# Patient Record
Sex: MALE | Race: WHITE
[De-identification: names, ages, dates, MRNs, and addresses within clinical notes are randomized per-mention and may not be internally consistent; named-entity substitution may affect disease eponyms.]

---

## 2018-09-27 ENCOUNTER — HOSPITAL ENCOUNTER (OUTPATIENT)
Age: 63
Discharge: HOME | End: 2018-09-27

## 2018-09-27 ENCOUNTER — HOSPITAL ENCOUNTER (OUTPATIENT)
Dept: HOSPITAL 91 - EEG | Age: 63
Discharge: HOME | End: 2018-09-27
Payer: COMMERCIAL

## 2018-09-27 DIAGNOSIS — G40.909: Primary | ICD-10-CM

## 2018-09-27 PROCEDURE — 95819 EEG AWAKE AND ASLEEP: CPT

## 2019-01-02 ENCOUNTER — HOSPITAL ENCOUNTER (EMERGENCY)
Dept: HOSPITAL 10 - E/R | Age: 64
Discharge: HOME | End: 2019-01-02
Payer: COMMERCIAL

## 2019-01-02 ENCOUNTER — HOSPITAL ENCOUNTER (EMERGENCY)
Dept: HOSPITAL 91 - E/R | Age: 64
Discharge: HOME | End: 2019-01-02
Payer: COMMERCIAL

## 2019-01-02 VITALS — DIASTOLIC BLOOD PRESSURE: 78 MMHG | RESPIRATION RATE: 18 BRPM | HEART RATE: 80 BPM | SYSTOLIC BLOOD PRESSURE: 134 MMHG

## 2019-01-02 VITALS
HEIGHT: 66 IN | BODY MASS INDEX: 26.36 KG/M2 | WEIGHT: 164.02 LBS | WEIGHT: 164.02 LBS | BODY MASS INDEX: 26.36 KG/M2 | HEIGHT: 66 IN

## 2019-01-02 DIAGNOSIS — R40.2252: ICD-10-CM

## 2019-01-02 DIAGNOSIS — K40.90: Primary | ICD-10-CM

## 2019-01-02 DIAGNOSIS — R40.2142: ICD-10-CM

## 2019-01-02 DIAGNOSIS — R40.2362: ICD-10-CM

## 2019-01-02 PROCEDURE — 99283 EMERGENCY DEPT VISIT LOW MDM: CPT

## 2019-01-02 NOTE — ERD
ER Documentation


Chief Complaint


Chief Complaint





c/o right groin pain. Hx: inguinal hernia. Cough with greenish plegm





HPI


Patient is a 63-year-old male with seizures who presents with a right-sided 


inguinal hernia.  He describes it as a "burning".  He has had this for the past 


3 weeks.  He said that it was worse last night after he was coughing.  He had a 


bowel movement today.  He denies vomiting.  He is currently taking Tylenol and 3


days of Keflex for a cough.  Upon review of old medical records the patient has 


previous visits with seizures.  He does have a primary doctor but no surgeon.





ROS


All systems reviewed and are negative except as per history of present illness.





Medications


Home Meds


Active Scripts


Cephalexin* (Keflex*) 500 Mg Capsule, 500 MG PO BID for 4 Days, CAP


   Prov:PATRICK ANNA MD         1/2/19


Ibuprofen* (Motrin*) 600 Mg Tab, 600 MG PO Q6H PRN for PAIN AND OR ELEVATED 


TEMP, #30 TAB


   Prov:PATRICK ANNA MD         1/2/19


Discontinued Reported Medications


Ketoprofen (Orudis) 50 Mg Capsule, 50 MG PO QHS, CAP


   10/4/16


Levetiracetam* (Keppra*) 750 Mg Tablet, 1500 MG PO BID, TAB


   10/4/16





Allergies


Allergies:  


Coded Allergies:  


     Penicillins (Verified  Allergy, Unknown, swelling, 1/2/19)


     Sulfa (Sulfonamide Antibiotics) (Verified  Allergy, Unknown, swelling, 


1/2/19)





PMhx/Soc


History of Surgery:  Yes (right hip ORIF)


Anesthesia Reaction:  No


Hx Neurological Disorder:  Yes (seizures)


Hx Respiratory Disorders:  No


Hx Cardiac Disorders:  No


Hx Psychiatric Problems:  No


Hx Miscellaneous Medical Probl:  No


Hx Alcohol Use:  No


Hx Substance Use:  No


Hx Tobacco Use:  No


Smoking Status:  Never smoker





FmHx


Family History:  No diabetes





Physical Exam


Vitals





Vital Signs


  Date      Temp  Pulse  Resp  B/P (MAP)   Pulse Ox  O2          O2 Flow    FiO2


Time                                                 Delivery    Rate


    1/2/19           80    18      134/78        99  Room Air


     17:18                           (96)


    1/2/19  97.9     78    20      136/92        98


     15:53                          (107)





Physical Exam


Const:   No acute distress


Head:   Atraumatic 


Eyes:    Normal Conjunctiva


ENT:    Normal External Ears, Nose and Mouth.


Neck:               Full range of motion. No meningismus.


Resp:   Clear to auscultation bilaterally


Cardio:   Regular rate and rhythm, no murmurs


Abd:    Soft, easily reducible right inguinal hernia without signs of 


incarceration


Skin:   No petechiae or rashes


Back:   No midline or flank tenderness


Ext:    No cyanosis, or edema


Neur:   Awake and alert


Psych:    Normal Mood and Affect





Procedures/MDM


Patient is a 63-year-old male who presents with a reducible right inguinal 


hernia.  The patient has no signs of strangulation or incarceration.  The 


patient will be discharged home.  He will need to follow-up with Dr. Flores from


surgery for elective hernia repair.  The patient does not need any further 


workup or admission and is otherwise well-appearing.  He will be given 4 more 


days of Keflex for a total of 7 days for his cough.  He can return for any 


worsening symptoms.





Departure


Diagnosis:  


   Primary Impression:  


   Hernia


Condition:  Fair


Patient Instructions:  Hernia (Inguinal, Ventral, Umbilical)


Referrals:  


NONA FLORES MD





Additional Instructions:  


SPECIALIST:  YOU HAVE A MEDICAL CONDITION WHICH REQUIRES YOU TO SEE A   


SPECIALIST WITHIN THE NEXT 1-2 DAYS. PLEASE FOLLOW UP WITH YOUR PRIMARY 


PHYSICIAN FOR REFFERAL.IF YOU DO NOT HAVE A PRIMARY CARE PHYSICIAN AND/OR YOU 


CAN NOT AFFORD TO SEE A PHYSICIAN THE FOLLOWING RESOURCES HAVE BEEN SUPPLIED TO 


YOU. IT IS YOUR RESPONSIBILITY TO BE SEEN BY THE SPECIALIST











PATRICK ANNA MD                 Jan 2, 2019 18:41

## 2019-01-25 ENCOUNTER — HOSPITAL ENCOUNTER (OUTPATIENT)
Dept: HOSPITAL 10 - SDS | Age: 64
Discharge: HOME | End: 2019-01-25
Attending: SURGERY
Payer: COMMERCIAL

## 2019-01-25 ENCOUNTER — HOSPITAL ENCOUNTER (OUTPATIENT)
Dept: HOSPITAL 91 - SDS | Age: 64
Discharge: HOME | End: 2019-01-25
Payer: COMMERCIAL

## 2019-01-25 VITALS — SYSTOLIC BLOOD PRESSURE: 127 MMHG | HEART RATE: 56 BPM | DIASTOLIC BLOOD PRESSURE: 79 MMHG | RESPIRATION RATE: 13 BRPM

## 2019-01-25 VITALS — DIASTOLIC BLOOD PRESSURE: 78 MMHG | RESPIRATION RATE: 16 BRPM | HEART RATE: 64 BPM | SYSTOLIC BLOOD PRESSURE: 125 MMHG

## 2019-01-25 VITALS — DIASTOLIC BLOOD PRESSURE: 78 MMHG | RESPIRATION RATE: 17 BRPM | SYSTOLIC BLOOD PRESSURE: 144 MMHG | HEART RATE: 62 BPM

## 2019-01-25 VITALS — DIASTOLIC BLOOD PRESSURE: 86 MMHG | RESPIRATION RATE: 28 BRPM | SYSTOLIC BLOOD PRESSURE: 139 MMHG | HEART RATE: 64 BPM

## 2019-01-25 VITALS — DIASTOLIC BLOOD PRESSURE: 57 MMHG | RESPIRATION RATE: 12 BRPM | HEART RATE: 68 BPM | SYSTOLIC BLOOD PRESSURE: 114 MMHG

## 2019-01-25 VITALS — DIASTOLIC BLOOD PRESSURE: 80 MMHG | HEART RATE: 62 BPM | RESPIRATION RATE: 21 BRPM | SYSTOLIC BLOOD PRESSURE: 128 MMHG

## 2019-01-25 VITALS — SYSTOLIC BLOOD PRESSURE: 137 MMHG | DIASTOLIC BLOOD PRESSURE: 90 MMHG | HEART RATE: 62 BPM | RESPIRATION RATE: 17 BRPM

## 2019-01-25 VITALS
BODY MASS INDEX: 27.66 KG/M2 | WEIGHT: 162.04 LBS | HEIGHT: 64 IN | BODY MASS INDEX: 27.66 KG/M2 | BODY MASS INDEX: 27.66 KG/M2 | HEIGHT: 64 IN | WEIGHT: 162.04 LBS

## 2019-01-25 VITALS — SYSTOLIC BLOOD PRESSURE: 150 MMHG | DIASTOLIC BLOOD PRESSURE: 83 MMHG | HEART RATE: 62 BPM | RESPIRATION RATE: 21 BRPM

## 2019-01-25 VITALS — HEART RATE: 62 BPM | DIASTOLIC BLOOD PRESSURE: 78 MMHG | SYSTOLIC BLOOD PRESSURE: 122 MMHG | RESPIRATION RATE: 13 BRPM

## 2019-01-25 VITALS — HEART RATE: 60 BPM | SYSTOLIC BLOOD PRESSURE: 131 MMHG | DIASTOLIC BLOOD PRESSURE: 80 MMHG | RESPIRATION RATE: 21 BRPM

## 2019-01-25 VITALS — DIASTOLIC BLOOD PRESSURE: 87 MMHG | SYSTOLIC BLOOD PRESSURE: 133 MMHG | RESPIRATION RATE: 17 BRPM | HEART RATE: 60 BPM

## 2019-01-25 VITALS — HEART RATE: 70 BPM | DIASTOLIC BLOOD PRESSURE: 71 MMHG | RESPIRATION RATE: 11 BRPM | SYSTOLIC BLOOD PRESSURE: 119 MMHG

## 2019-01-25 VITALS — SYSTOLIC BLOOD PRESSURE: 128 MMHG | DIASTOLIC BLOOD PRESSURE: 66 MMHG | RESPIRATION RATE: 17 BRPM | HEART RATE: 79 BPM

## 2019-01-25 VITALS — HEART RATE: 79 BPM | RESPIRATION RATE: 20 BRPM | SYSTOLIC BLOOD PRESSURE: 136 MMHG | DIASTOLIC BLOOD PRESSURE: 81 MMHG

## 2019-01-25 VITALS — HEART RATE: 70 BPM | RESPIRATION RATE: 21 BRPM | DIASTOLIC BLOOD PRESSURE: 73 MMHG | SYSTOLIC BLOOD PRESSURE: 120 MMHG

## 2019-01-25 VITALS — HEART RATE: 66 BPM | DIASTOLIC BLOOD PRESSURE: 66 MMHG | SYSTOLIC BLOOD PRESSURE: 126 MMHG | RESPIRATION RATE: 26 BRPM

## 2019-01-25 VITALS — SYSTOLIC BLOOD PRESSURE: 121 MMHG | RESPIRATION RATE: 26 BRPM | HEART RATE: 60 BPM | DIASTOLIC BLOOD PRESSURE: 76 MMHG

## 2019-01-25 VITALS — RESPIRATION RATE: 15 BRPM | SYSTOLIC BLOOD PRESSURE: 146 MMHG | DIASTOLIC BLOOD PRESSURE: 86 MMHG

## 2019-01-25 VITALS — RESPIRATION RATE: 16 BRPM | HEART RATE: 64 BPM | SYSTOLIC BLOOD PRESSURE: 123 MMHG | DIASTOLIC BLOOD PRESSURE: 68 MMHG

## 2019-01-25 VITALS — RESPIRATION RATE: 19 BRPM | SYSTOLIC BLOOD PRESSURE: 146 MMHG | HEART RATE: 60 BPM | DIASTOLIC BLOOD PRESSURE: 81 MMHG

## 2019-01-25 DIAGNOSIS — K40.90: Primary | ICD-10-CM

## 2019-01-25 DIAGNOSIS — D17.6: ICD-10-CM

## 2019-01-25 LAB
INR: 0.86
PARTIAL THROMBOPLASTIN TIME: 30.5 SEC (ref 23–35)
PROTIME: 11.8 SEC (ref 11.9–14.9)
PT RATIO: 0.9

## 2019-01-25 PROCEDURE — 88302 TISSUE EXAM BY PATHOLOGIST: CPT

## 2019-01-25 PROCEDURE — C1781 MESH (IMPLANTABLE): HCPCS

## 2019-01-25 PROCEDURE — 88304 TISSUE EXAM BY PATHOLOGIST: CPT

## 2019-01-25 PROCEDURE — 85610 PROTHROMBIN TIME: CPT

## 2019-01-25 PROCEDURE — 85730 THROMBOPLASTIN TIME PARTIAL: CPT

## 2019-01-25 PROCEDURE — 49505 PRP I/HERN INIT REDUC >5 YR: CPT

## 2019-01-25 RX ADMIN — BUPIVACAINE HYDROCHLORIDE AND EPINEPHRINE BITARTRATE 1 ML: 5; .005 INJECTION, SOLUTION EPIDURAL; INTRACAUDAL; PERINEURAL at 12:35

## 2019-01-25 RX ADMIN — HYDROCODONE BITARTRATE AND ACETAMINOPHEN 1 TAB: 5; 325 TABLET ORAL at 17:54

## 2019-01-25 RX ADMIN — FENTANYL CITRATE 1 MCG: 50 INJECTION, SOLUTION INTRAMUSCULAR; INTRAVENOUS at 14:30

## 2019-01-25 RX ADMIN — HYDROMORPHONE HYDROCHLORIDE 1 MG: 2 INJECTION INTRAMUSCULAR; INTRAVENOUS; SUBCUTANEOUS at 14:11

## 2019-01-25 RX ADMIN — LIDOCAINE HYDROCHLORIDE 1 ML: 10 INJECTION, SOLUTION INFILTRATION; PERINEURAL at 12:35

## 2019-01-25 RX ADMIN — BACITRACIN 1 ML: 50000 INJECTION, POWDER, FOR SOLUTION INTRAMUSCULAR at 12:36

## 2019-01-25 NOTE — HPN
Date/Time of Note


Date/Time of Note


DATE: 1/25/19 


TIME: 09:40





Interval H&P Admission Note


Pt. seen H&P reviewed:  No system changes











ANTON MAYFIELD                   Jan 25, 2019 09:40

## 2019-01-25 NOTE — PAC
Date/Time of Note


Date/Time of Note


DATE: 1/25/19 


TIME: 13:44





Post-Anesthesia Notes


Post-Anesthesia Note


Last documented vital signs





Vital Signs


  Date      Temp  Pulse  Resp  B/P (MAP)   Pulse Ox  O2          O2 Flow    FiO2


Time                                                 Delivery    Rate


   1/25/19  97.8     64    16      123/68        99  Room Air


     09:34                           (86)





Activity:  WNL


Respiratory function:  WNL


Cardiovascular function:  WNL


Mental status:  Baseline


Pain reasonably controlled:  Yes


Hydration appropriate:  Yes


Nausea/Vomiting absent:  No











BENJY HEAD MD               Jan 25, 2019 13:45

## 2019-01-25 NOTE — OPR
Date/Time of Note


Date/Time of Note


DATE: 1/25/19 


TIME: 11:58





Operative Report


Procedure Date:  Jan 25, 2019


Preoperative Diagnosis


Right inguinal hernia, initial, reducible


Postoperative Diagnosis


Same, lipoma of the cord


Operation/Procedure Performed


Right inguinal hernia repair with mesh, excision of spermatic cord lesion


Surgeon


Anton Mayfield MD FACS


Assistant


None


Anesthesia Type:  general


Estimated Blood Loss:  10 - 50 ml's


Transfusion


   none


Specimen


Hernia sac sent to pathology


Grafts/Implants


none


Complications


none


Pt Condition Post Procedure:  stable


Disposition:  PACU


Indications


This patient presented with recurrent pain followed by a lump he noted in his 


right groin region.  Workup revealed a right inguinal hernia that is reducible. 


For treatment he will undergo repair of the hernia with mesh and excision of 


accompanying lipoma of the cord if necessary.  Risk and benefits including 


bleeding infection and recurrence were explained and informed consent was 


obtained.


Procedure Description


The patient was laid supine on the operating room table.  Venodyne boots were 


applied.  Timeout was conducted.  Antibiotics were administered.  The groin was 


prepped and draped in sterile manner.  A 7 cm incision was made along the skin 


lines 2/3 of the way distally from the anterior superior iliac spine to the 


patient's pubic tubercle on the right side.  The incision was made with a 


scalpel and then deepened with cautery through Camper's and Tavia's fascia 


until the external oblique aponeurosis was encountered.  A nick was made along 


the center of the external oblique upon neurosis with the blade and thereafter 


the Metzenbaum scissors were used to open the external ring from the pubic 


tubercle up towards the anterior superior iliac spine.  The ilioinguinal nerve 


was identified and moved out of the operative field sway.  The surgeon then used


blunt dissection with a sponge stick to isolate the cord structures from 


surrounding fascia and thereafter encircled the cord structures with a Penrose 


drain.  The Penrose drain was used to retract the cord structures and the ileal 


inguinal nerve out of the operative field.  The cord was examined and along the 


lipoma was noted.  Excision of lipoma of the cord was carried out.  The hernia 


sac was then identified and noted to be adherent to the cremasteric fibers and 


the spermatic cord.  Using electrocautery the hernia sac was dissected away from


the structures.  The hernia sac was thereafter opened and inspected there was no


bowel or anything suspicious inside it.  All hernia sac content was reduced back


into the peritoneum and thereafter high ligation of the sac was carried out with


a 0 silk suture and the hernia sac was reduced into the peritoneal cavity.  The 


fascia was then inspected and dressed lateral to the epigastric vessels a 


indirect hernia defect was identified.  The fascial defect was closed with 


interrupted 0 Vicryl sutures and reinforced with a Prolene plug mesh.  


Interrupted Vicryl sutures were used to affix the petals of the plug to the 


inner surface of the conjoint tendon at the area of the fascial defect.  Once 


the fascial defect was closed patch mesh was brought onto the field.  This was 


likewise a Bard Prolene mesh and it was cut to appropriate size.  The medial 


edge of the mesh was affixed to the patient's right pubic periosteum about 1 cm 


to the area of the tubercle.  Afterwards a running 0 Vicryl suture was used to 


affix the superior edge of the mesh to the patient's conjoint tendon from the 


pubic tubercle laterally toward the anterior superior iliac spine.  A second 


Vicryl suture was then used to affix the patch mesh to the shelving edge of the 


ileal inguinal ligament from the area of the pubic tubercle toward the anterior 


superior iliac spine using running 0 Vicryl suture.  The spermatic cord was 


placed in between the 2 fish tails of the patch mesh and the 2 fishtails were 


thereafter affixed to each other using an interrupted suture taking care to 


ensure there is no impingement upon the spermatic cord.  The wound was then 


irrigated with saline.  The cord structures and ilioinguinal nerve were then 


placed back into their anatomic position.  The external oblique fascia was 


thereafter reapproximated using running 0 Vicryl suture.  The subcutaneous 


tissue was then reapproximated using 2-0 Vicryl sutures.  The skin was then 


closed with 4-0 Monocryl and Dermabond dressing was applied after wet and dry 


dressings were applied to the wound.  At the end of the procedure I confirmed 


that the patient's testicles are in the appropriate position.  The patient was 


after weaned from anesthesia and disposition to the postanesthesia care unit in 


stable condition.  All instrument and needle and sponge counts were correct at 


the end of the procedure 2.











ANTON MAYFIELD                   Jan 25, 2019 11:59

## 2019-01-28 ENCOUNTER — HOSPITAL ENCOUNTER (OUTPATIENT)
Dept: HOSPITAL 91 - E/R | Age: 64
Setting detail: OBSERVATION
LOS: 1 days | Discharge: HOME | End: 2019-01-29
Payer: COMMERCIAL

## 2019-01-28 ENCOUNTER — HOSPITAL ENCOUNTER (OUTPATIENT)
Dept: HOSPITAL 10 - E/R | Age: 64
Setting detail: OBSERVATION
LOS: 1 days | Discharge: HOME | End: 2019-01-29
Attending: INTERNAL MEDICINE | Admitting: INTERNAL MEDICINE
Payer: COMMERCIAL

## 2019-01-28 VITALS
BODY MASS INDEX: 26.96 KG/M2 | HEIGHT: 66 IN | WEIGHT: 167.77 LBS | HEIGHT: 66 IN | BODY MASS INDEX: 26.96 KG/M2 | WEIGHT: 167.77 LBS

## 2019-01-28 VITALS — RESPIRATION RATE: 18 BRPM | HEART RATE: 64 BPM | DIASTOLIC BLOOD PRESSURE: 92 MMHG | SYSTOLIC BLOOD PRESSURE: 159 MMHG

## 2019-01-28 DIAGNOSIS — R10.31: Primary | ICD-10-CM

## 2019-01-28 LAB
ADD MAN DIFF?: YES
ANION GAP: 11 (ref 5–13)
ANISOCYTOSIS: (no result) (ref 0–0)
BASOPHIL #M: 0.1 10^3/UL (ref 0–0)
BASOPHILS % (M): 2 % (ref 0–2)
BLOOD UREA NITROGEN: 13 MG/DL (ref 7–20)
BURR CELLS: (no result) (ref 0–0)
CALCIUM: 9 MG/DL (ref 8.4–10.2)
CARBON DIOXIDE: 29 MMOL/L (ref 21–31)
CHLORIDE: 101 MMOL/L (ref 97–110)
CREATININE: 0.75 MG/DL (ref 0.61–1.24)
EOSINOPHILS % (M): 2 % (ref 0–7)
GLUCOSE: 100 MG/DL (ref 70–220)
HEMATOCRIT: 34.8 % (ref 42–52)
HEMOGLOBIN: 11.5 G/DL (ref 14–18)
IMMATURE GRANS #M: 0.08 10^3/UL (ref 0–0.03)
IMMATURE GRANS % (M): 0.9 % (ref 0–0.43)
LYMPHOCYTES #M: 3 10^3/UL (ref 0.8–2.9)
LYMPHOCYTES % (M): 35 % (ref 15–51)
MEAN CORPUSCULAR HEMOGLOBIN: 29.6 PG (ref 29–33)
MEAN CORPUSCULAR HGB CONC: 33 G/DL (ref 32–37)
MEAN CORPUSCULAR VOLUME: 89.5 FL (ref 82–101)
MEAN PLATELET VOLUME: 9.7 FL (ref 7.4–10.4)
MICROCYTOSIS: (no result) (ref 0–0)
MONOCYTE #M: 0.7 10^3/UL (ref 0.3–0.9)
MONOCYTES % (M): 8 % (ref 0–11)
NUCLEATED RED BLOOD CELLS%: 0 /100WBC (ref 0–0)
PLATELET COUNT: 183 10^3/UL (ref 140–415)
PLATELET ESTIMATE: NORMAL
POLYCHROMASIA: (no result) (ref 0–0)
POTASSIUM: 3.9 MMOL/L (ref 3.5–5.1)
RED BLOOD COUNT: 3.89 10^6/UL (ref 4.7–6.1)
RED CELL DISTRIBUTION WIDTH: 13.1 % (ref 11.5–14.5)
SEGMENTED NEUTROPHILS (M) %: 53 % (ref 39–77)
SMUDGE%M: 27 % (ref 0–0)
SODIUM: 141 MMOL/L (ref 135–144)
WHITE BLOOD COUNT: 8.8 10^3/UL (ref 4.8–10.8)

## 2019-01-28 PROCEDURE — 36415 COLL VENOUS BLD VENIPUNCTURE: CPT

## 2019-01-28 PROCEDURE — 96375 TX/PRO/DX INJ NEW DRUG ADDON: CPT

## 2019-01-28 PROCEDURE — 96365 THER/PROPH/DIAG IV INF INIT: CPT

## 2019-01-28 PROCEDURE — 85025 COMPLETE CBC W/AUTO DIFF WBC: CPT

## 2019-01-28 PROCEDURE — 72193 CT PELVIS W/DYE: CPT

## 2019-01-28 PROCEDURE — 80048 BASIC METABOLIC PNL TOTAL CA: CPT

## 2019-01-28 PROCEDURE — C9113 INJ PANTOPRAZOLE SODIUM, VIA: HCPCS

## 2019-01-28 PROCEDURE — G0378 HOSPITAL OBSERVATION PER HR: HCPCS

## 2019-01-28 PROCEDURE — 99285 EMERGENCY DEPT VISIT HI MDM: CPT

## 2019-01-28 RX ADMIN — PIPERACILLIN SODIUM AND TAZOBACTAM SODIUM 1 MLS/HR: 3; .375 INJECTION, POWDER, LYOPHILIZED, FOR SOLUTION INTRAVENOUS at 21:24

## 2019-01-28 RX ADMIN — HYDROMORPHONE HYDROCHLORIDE 1 MG: 1 INJECTION, SOLUTION INTRAMUSCULAR; INTRAVENOUS; SUBCUTANEOUS at 21:13

## 2019-01-28 RX ADMIN — VANCOMYCIN HYDROCHLORIDE 1 MLS/HR: 1 INJECTION, POWDER, LYOPHILIZED, FOR SOLUTION INTRAVENOUS at 22:51

## 2019-01-28 RX ADMIN — CEFEPIME HYDROCHLORIDE 1 MLS/HR: 2 INJECTION, POWDER, FOR SOLUTION INTRAVENOUS at 22:07

## 2019-01-28 RX ADMIN — DIPHENHYDRAMINE HYDROCHLORIDE 1 MG: 50 INJECTION, SOLUTION INTRAMUSCULAR; INTRAVENOUS at 21:51

## 2019-01-28 NOTE — ERD
ER Documentation


Chief Complaint


Chief Complaint





AP worse today; s/p hernia sx last friday; sent by PMD here





HPI


63-year-old male status post right inguinal hernia repair on 1/25/2019 by Dr. Galvan, presenting with worsening right inguinal pain and swelling.  His pain 


is 9 out of 10, worse with movement and laying down.  No alleviating factors.  


Norco does not help with pain.  He spoke with his surgeon, who recommended he 


come to the ER.  He denies any fevers or chills.  No nausea or vomiting.  He was


constipated but took stool softeners with a bowel movement today.  No dysuria or


hematuria.





ROS


All systems reviewed and are negative except as per history of present illness.





Medications


Home Meds


Reported Medications


Trazodone Hcl* (Trazodone Hcl*) 50 Mg Tablet, 50 MG PO DAILY, #30 TAB


   1/28/19


Diclofenac Sodium* (Diclofenac Sodium*) 50 Mg Tablet.dr, 50 MG PO TID, #90 TAB


   1/28/19


Hydrocodone Bit-Acetaminophen* (Vicodin*) 5-300 Tab, 2 TAB PO Q4H PRN for PAIN, 


TAB


   1/28/19


Diclofenac Sodium* (Diclofenac Sodium*) 75 Mg Tablet.dr, 75 MG PO DAILY PRN for 


PAIN AND/OR INFLAMMATION, #60 TAB


   1/25/19


Discontinued Scripts


Cephalexin* (Keflex*) 500 Mg Capsule, 500 MG PO BID for 4 Days, CAP


   Prov:PATRICK ANNA MD         1/2/19


Ibuprofen* (Motrin*) 600 Mg Tab, 600 MG PO Q6H PRN for PAIN AND OR ELEVATED 


TEMP, #30 TAB


   Prov:PATRICK ANNA MD         1/2/19





Allergies


Allergies:  


Coded Allergies:  


     Sulfa (Sulfonamide Antibiotics) (Verified  Allergy, Unknown, swelling, 


1/25/19)


     piperacillin (Verified  Allergy, Unknown, lip swelling & redness, facial 


itching & flushing, 1/28/19)


     tazobactam (Verified  Allergy, Unknown, lip swelling & redness, facial 


itching & flushing, 1/28/19)





PMhx/Soc


History of Surgery:  Yes (RIGHT HIP ORIF, TONSILLECTOMY, Inguinal Hernia)


Anesthesia Reaction:  No


Hx Neurological Disorder:  Yes (SEIZURE)


Hx Respiratory Disorders:  No


Hx Cardiac Disorders:  No


Hx Psychiatric Problems:  No


Hx Miscellaneous Medical Probl:  No


Hx Alcohol Use:  No


Hx Substance Use:  No


Hx Tobacco Use:  No


Smoking Status:  Never smoker





FmHx


Family History:  No diabetes





Physical Exam


Vitals





Vital Signs


  Date      Temp  Pulse  Resp  B/P (MAP)   Pulse Ox  O2          O2 Flow    FiO2


Time                                                 Delivery    Rate


   1/28/19  98.1     71    20      113/57        98


     18:42                           (75)





Physical Exam


Const:   No acute distress in mild distress secondary to pain, nontoxic


Head:   Atraumatic 


Eyes:    Normal Conjunctiva


ENT:    Normal External Ears, Nose and Mouth.


Neck:               Full range of motion. No meningismus.


Resp:   Clear to auscultation bilaterally


Cardio:   Regular rate and rhythm, no murmurs


Abd:    Soft, non tender, non distended. Normal bowel sounds.  Right inguinal 


area with erythema and bruising.  There is swelling with significant tenderness 


to palpation.  No crepitus.


:    Penis and scotum apper normal, nontender


Back:   No midline or flank tenderness


Ext:    No cyanosis, or edema


Neur:   Awake and alert


Psych:    Normal Mood and Affect


Result Diagram:  


1/28/19 2104 1/28/19 2104





Results 24 hrs





Laboratory Tests


              Test
                                 1/28/19
21:04


              White Blood Count                      8.8 10^3/ul


              Red Blood Count                       3.89 10^6/ul


              Hemoglobin                               11.5 g/dl


              Hematocrit                                  34.8 %


              Mean Corpuscular Volume                    89.5 fl


              Mean Corpuscular Hemoglobin                29.6 pg


              Mean Corpuscular Hemoglobin
Concent     33.0 g/dl 



              Red Cell Distribution Width                 13.1 %


              Platelet Count                         183 10^3/UL


              Mean Platelet Volume                        9.7 fl


              Immature Granulocytes %                    0.900 %


              Neutrophils %                         %


              Lymphocytes %                         %


              Monocytes %                           %


              Eosinophils %                         %


              Basophils %                           %


              Nucleated Red Blood Cells %            0.0 /100WBC


              Immature Granulocytes #              0.080 10^3/ul


              Neutrophils #                         10^3/ul


              Lymphocytes #                         10^3/ul


              Monocytes #                           10^3/ul


              Eosinophils #                         10^3/ul


              Basophils #                           10^3/ul


              Nucleated Red Blood Cells #           10^3/ul


              Sodium Level                            141 mmol/L


              Potassium Level                         3.9 mmol/L


              Chloride Level                          101 mmol/L


              Carbon Dioxide Level                     29 mmol/L


              Anion Gap                                       11


              Blood Urea Nitrogen                       13 mg/dl


              Creatinine                              0.75 mg/dl


              Est Glomerular Filtrat Rate
mL/min   > 60 mL/min 



              Glucose Level                            100 mg/dl


              Calcium Level                            9.0 mg/dl





Current Medications


 Medications
   Dose
          Sig/Homero
       Start Time
   Status  Last


 (Trade)       Ordered        Route
 PRN     Stop Time              Admin
Dose


                              Reason                                Admin


                1 mg           ONCE  STAT
    1/28/19       DC           1/28/19


Hydromorphone                 IV
            20:37
                       21:13



HCl
                                         1/28/19 20:38


(Dilaudid)


 Vancomycin     250 ml @ 
     ONCE  STAT
    1/28/19                



HCl            125 mls/hr     IVPB
          21:02



                                             1/28/19 23:01


 Piperacillin   100 ml @ 
     ONCE  STAT
    1/28/19       DC           1/28/19


Sod/
          200 mls/hr     IVPB
          21:02
                       21:24



Tazobactam                                   1/28/19 21:31


Sod


 Ondansetron    4 mg           BR[facility]GE ORDER   1/28/19                



HCl
  (Zofran                 PRN
 IV
       21:30



Inj)                          NAUSEA/VOMITI  1/29/19 21:29


                              NG


                650 mg         ER BR[facility]GE      1/28/19                



Acetaminophen                 PRN
 PO
       21:30




  (Tylenol                   .MILD PAIN     1/29/19 21:29


Tab)                          1-3 OR TEMP


                25 mg          ONCE  ONCE
    1/28/19       DC           1/28/19


Diphenhydrami                 IV
            22:00
                       21:51



ne
 HCl
                                     1/28/19 22:01


(Benadryl)


 Cefepime HCl   50 ml @ 
      ONCE  ONCE
    1/28/19 1/28/19


               100 mls/hr     IVPB
          22:00
                       22:07



                                             1/28/19 22:29


 IV Flush
      10 ml          STK-MED        1/28/19       DC       



(NS 10 ml)                    ONCE
 .ROUTE
  22:04



                                             1/28/19 22:05


 Sodium         100 ml @ ud    STK-MED        1/28/19       DC       



Chloride                      ONCE
 .ROUTE
  22:04



                                             1/28/19 22:05


 Iohexol
       150 ml         STK-MED        1/28/19       DC       



(Omnipaque                    ONCE
 .ROUTE
  22:05



300mg/
 ml)                                  1/28/19 22:06








Procedures/MDM


EMERGENT LABS AND DIAGNOSTIC STUDIES: 


Lab Results above were reviewed and interpreted by me. 





CBC: no anemia or evidence of infection


BMP: No evidence of electrolyte abnormality, renal failure, hypoglycemia


___________________________________________________________ 


Radiology Results as interpreted by Radiology below were reviewed by ALEIDA Kenney MD: 





CT pelvis with IV contrast: Pending





___________________________________________________________ 


Initial Nursing notes reviewed. 


Previous Medical Records requested via the Electronic Health Record. 





EMERGENCY DEPARTMENT COURSE / MEDICAL DECISION MAKING: 








Patient is presenting with right inguinal increasing pain after hernia repair 3 


days ago.  There may be an associated infection versus hematoma.  IV antibiotics


were started.  I spoke with his surgeon, Who recommended CT and admission for IV


antibiotics.  At time of admission, CT is still pending.  IV antibiotics have 


been started.





Accepting Care Team:


Current data and ongoing care discussed.


Time:                      Time of admission


Primary Provider:      Dr. Maldonado


Consulting:                  Dr. Galvan


Outstanding Data:       none





Departure


Diagnosis:  


   Primary Impression:  


   Right inguinal pain


Condition:  Fair











JAMES KENNEY MD         Jan 28, 2019 22:18

## 2019-01-29 VITALS — RESPIRATION RATE: 18 BRPM | SYSTOLIC BLOOD PRESSURE: 159 MMHG | HEART RATE: 71 BPM | DIASTOLIC BLOOD PRESSURE: 82 MMHG

## 2019-01-29 VITALS — RESPIRATION RATE: 20 BRPM | HEART RATE: 71 BPM | SYSTOLIC BLOOD PRESSURE: 156 MMHG | DIASTOLIC BLOOD PRESSURE: 96 MMHG

## 2019-01-29 VITALS — HEART RATE: 74 BPM | DIASTOLIC BLOOD PRESSURE: 80 MMHG | SYSTOLIC BLOOD PRESSURE: 125 MMHG | RESPIRATION RATE: 20 BRPM

## 2019-01-29 LAB
ADD MAN DIFF?: NO
ANION GAP: 11 (ref 5–13)
BASOPHIL #: 0.1 10^3/UL (ref 0–0.1)
BASOPHILS %: 0.6 % (ref 0–2)
BLOOD UREA NITROGEN: 14 MG/DL (ref 7–20)
CALCIUM: 8.8 MG/DL (ref 8.4–10.2)
CARBON DIOXIDE: 30 MMOL/L (ref 21–31)
CHLORIDE: 101 MMOL/L (ref 97–110)
CREATININE: 0.66 MG/DL (ref 0.61–1.24)
EOSINOPHILS #: 0.2 10^3/UL (ref 0–0.5)
EOSINOPHILS %: 2.8 % (ref 0–7)
GLUCOSE: 105 MG/DL (ref 70–220)
HEMATOCRIT: 36.2 % (ref 42–52)
HEMOGLOBIN: 12.1 G/DL (ref 14–18)
IMMATURE GRANS #M: 0.04 10^3/UL (ref 0–0.03)
IMMATURE GRANS % (M): 0.5 % (ref 0–0.43)
LYMPHOCYTES #: 2.7 10^3/UL (ref 0.8–2.9)
LYMPHOCYTES %: 32.9 % (ref 15–51)
MEAN CORPUSCULAR HEMOGLOBIN: 29.8 PG (ref 29–33)
MEAN CORPUSCULAR HGB CONC: 33.4 G/DL (ref 32–37)
MEAN CORPUSCULAR VOLUME: 89.2 FL (ref 82–101)
MEAN PLATELET VOLUME: 9.7 FL (ref 7.4–10.4)
MONOCYTE #: 1.1 10^3/UL (ref 0.3–0.9)
MONOCYTES %: 13.1 % (ref 0–11)
NEUTROPHIL #: 4.1 10^3/UL (ref 1.6–7.5)
NEUTROPHILS %: 50.1 % (ref 39–77)
NUCLEATED RED BLOOD CELLS #: 0 10^3/UL (ref 0–0)
NUCLEATED RED BLOOD CELLS%: 0 /100WBC (ref 0–0)
PLATELET COUNT: 197 10^3/UL (ref 140–415)
POTASSIUM: 3.6 MMOL/L (ref 3.5–5.1)
RED BLOOD COUNT: 4.06 10^6/UL (ref 4.7–6.1)
RED CELL DISTRIBUTION WIDTH: 12.9 % (ref 11.5–14.5)
SODIUM: 142 MMOL/L (ref 135–144)
WHITE BLOOD COUNT: 8.1 10^3/UL (ref 4.8–10.8)

## 2019-01-29 RX ADMIN — PANTOPRAZOLE SODIUM 1 MG: 40 INJECTION, POWDER, FOR SOLUTION INTRAVENOUS at 06:07

## 2019-01-29 RX ADMIN — THIAMINE HYDROCHLORIDE 1 MLS/HR: 100 INJECTION, SOLUTION INTRAMUSCULAR; INTRAVENOUS at 01:23

## 2019-01-29 RX ADMIN — LEVETIRACETAM 1 MG: 750 TABLET ORAL at 09:02

## 2019-01-29 RX ADMIN — VANCOMYCIN HYDROCHLORIDE 1 MLS/HR: 1 INJECTION, POWDER, LYOPHILIZED, FOR SOLUTION INTRAVENOUS at 06:06

## 2019-01-29 RX ADMIN — OXYCODONE HYDROCHLORIDE AND ACETAMINOPHEN 1 TAB: 10; 325 TABLET ORAL at 09:45

## 2019-01-29 RX ADMIN — DOCUSATE SODIUM 1 MG: 100 CAPSULE, LIQUID FILLED ORAL at 09:44

## 2019-01-29 NOTE — NUR
DISCHARGE NOTES

PT D/C TO HOME VIA WHEELCHAIR ACCOMPANIED BY BROTHER. IV REMOVED, CATHETER INTACT. DISCHARGE 
INSTRUCTIONS 

PROVIDED WITH THE OPPORTUNITY TO ASK QUESTIONS. INFORMED PT TO FOLLOW UP WITH PCP IN 1-2 
WEEKS AND DR MAYFIELD IN 1 WEEK.

PRESCRIPTION WAS SENT TO PHARMACY PER MD MAYFIELD. PT IS TO TAKE MEDICATIONS AS PRESCRIBED. 
INFORMED PT TO CALL [phone]

OR GO TO THE NEAREST EMERGENCY ROOM IF EXPERIENCING CHEST PAIN, SHORTNESS OF BREATH, 
DIFFICULTY SPEAKING, 

VISION CHANGES, CONFUSION,OR ANY DISCOMFORT. PT VERBALIZES UNDERSTANDING OF DISCHARGE 
INSTRUCTIONS. PT ALERT, 

ORIENTED, AND STABLE UPON DISCHARGE.

## 2019-01-29 NOTE — NUR
PATIENT ASKED FOR STOOL SOFTENER.ACCORDING TO HIM HE DOES NOT HAVE A BM FOR DAYS. WILL RELAY 
TO INCOMING NURSE TO INFORM MD,

HE WANTS TO TALK TO HIS PMD AND .

## 2019-01-29 NOTE — NUR
MD Galvan sent Rx for percocet and laxatives to [facility] pharmacy. Pt pharmacy called said 
Percocet not covered by insurance. Request norco 10 which will be covered. MD Galvan made 
aware, MD will resend to pharmacy [place] [facility]. Pt made aware.

## 2019-01-29 NOTE — NUR
ADMISSION NOTES:



Admitted a 62 y/o male from ER with chief complain of worsening  right inguinal surgery site 
with swelling and pain diagnosed with right inguinal infected post op. Patient is 
alert,oriented and ambulatory with assist .Ushered to the assigned room. Admission 
assessments done and photos was taken per protocol. Instructed on the usage of light,bed and 
call button.Instructed to call for assistance. started on iv hydration as ordered by MD.will 
continue to monitor.

## 2019-01-29 NOTE — NUR
VANCO PER PHARMACY:

Primary Impression: Status post right inguinal hernia repair

Vanco  L.D. = 1 gm  X 1 then 

Give Vancomycin  1GM  q 12 hrs 

Pharmacy to follow

## 2019-01-29 NOTE — DS
Date/Time of Note


Date/Time of Note


DATE: 1/29/19 


TIME: 09:26





Discharge Summary


Admission/Discharge Info


Admit Date/Time


Jan 28, 2019 at 21:06


Discharge Date/Time





Discharge Diagnosis


Right inguinal pain


Patient Condition:  Good


Hx of Present Illness


This gentleman is postop day 3 from right open inguinal hernia repair.  He was 


concerned about swelling and discoloration of the wound and therefore was 


referred to the emergency department.  Workup reveals no abscess may be possible


cellulitis.  The emergency room called me and I requested the patient be 


admitted for observation.  The next morning surgeon came and saw the patient and


noted no infection and patient was therefore discharged.


Hospital Course


This gentleman is postop day 3 from right open inguinal hernia repair.  He was 


concerned about swelling and discoloration of the wound and therefore was 


referred to the emergency department.  Workup reveals no abscess may be possible


cellulitis.  The emergency room called me and I requested the patient be 


admitted for observation.  The next morning surgeon came and saw the patient and


noted no infection and patient was therefore discharged.


Home Meds


Reported Medications


Trazodone Hcl* (Trazodone Hcl*) 50 Mg Tablet, 50 MG PO DAILY, #30 TAB


   1/28/19


Diclofenac Sodium* (Diclofenac Sodium*) 50 Mg Tablet.dr, 50 MG PO TID, #90 TAB


   1/28/19


Hydrocodone Bit-Acetaminophen* (Vicodin*) 5-300 Tab, 2 TAB PO Q4H PRN for PAIN, 


TAB


   1/28/19


Diclofenac Sodium* (Diclofenac Sodium*) 75 Mg Tablet.dr, 75 MG PO DAILY PRN for 


PAIN AND/OR INFLAMMATION, #60 TAB


   1/25/19


Discontinued Scripts


Cephalexin* (Keflex*) 500 Mg Capsule, 500 MG PO BID for 4 Days, CAP


   Prov:PATRICK ANNA MD         1/2/19


Ibuprofen* (Motrin*) 600 Mg Tab, 600 MG PO Q6H PRN for PAIN AND OR ELEVATED 


TEMP, #30 TAB


   Prov:PATRICK ANNA MD         1/2/19


Follow-up Plan


Dr Galvan in two weeks


Primary Care Provider


Not On Staff Doctor


Time spent on discharge:  < 30 minutes


Pending Labs





Laboratory Tests


Test
                                   1/28/19
21:04              1/29/19
05:27


White Blood Count
             8.8 10^3/ul
(4.8-10.8)     8.1 10^3/ul
(4.8-10.8)


Red Blood Count
             3.89 10^6/ul
(4.70-6.10)   4.06 10^6/ul
(4.70-6.10)


Hemoglobin
                     11.5 g/dl
(14.0-18.0)      12.1 g/dl
(14.0-18.0)


Hematocrit
                        34.8 %
(42.0-52.0)         36.2 %
(42.0-52.0)


Mean Corpuscular Volume
         89.5 fl
(82.0-101.0)       89.2 fl
(82.0-101.0)


Mean Corpuscular                  29.6 pg
(29.0-33.0)        29.8 pg
(29.0-33.0)


Hemoglobin



Mean Corpuscular                33.0 g/dl
(32.0-37.0)      33.4 g/dl
(32.0-37.0)


Hemoglobin
Concent


Red Cell Distribution              13.1 %
(11.5-14.5)         12.9 %
(11.5-14.5)


Width



Platelet Count
                 183 10^3/UL
(140-415)      197 10^3/UL
(140-415)


Mean Platelet Volume
               9.7 fl
(7.4-10.4)          9.7 fl
(7.4-10.4)


Immature Granulocytes %
        0.900 %
(0.001-0.429)      0.500 %
(0.001-0.429)


Neutrophils %
               % (39.0-77.0) 
                  50.1 %
(39.0-77.0)


Segmented Neutrophils                  53 % (39-77) 
  



%
(Manual)


Lymphocytes %
               % (15.0-51.0) 
                  32.9 %
(15.0-51.0)


Lymphocytes % (Manual)                  35 % (15-51)


Monocytes %
                 % (0.0-11.0) 
                    13.1 %
(0.0-11.0)


Monocytes % (Manual)                      8 % (0-11)


Eosinophils %
               % (0.0-7.0) 
                       2.8 %
(0.0-7.0)


Eosinophils % (Manual)                     2 % (0-7)


Basophils %
                 % (0.0-2.0) 
                       0.6 %
(0.0-2.0)


Basophils % (Manual)                       2 % (0-2)


Nucleated Red Blood Cells       0.0 /100WBC
(0.0-0.0)      0.0 /100WBC
(0.0-0.0)


%



Immature Granulocytes #
    0.080 10^3/ul
(0.0-0.031)  0.040 10^3/ul
(0.0-0.031)


Neutrophils #
                      10^3/ul
(1.6-7.5)      4.1 10^3/ul
(1.6-7.5)


Lymphocytes (Manual)
           3.0 10^3/ul
(0.8-2.9)  



Lymphocytes #
                      10^3/ul
(0.8-2.9)      2.7 10^3/ul
(0.8-2.9)


Monocytes #
                        10^3/ul
(0.3-0.9)      1.1 10^3/ul
(0.3-0.9)


Monocytes # (Manual)
           0.7 10^3/ul
(0.3-0.9)  



Eosinophils #
                      10^3/ul
(0.0-0.5)      0.2 10^3/ul
(0.0-0.5)


Basophils #
                        10^3/ul
(0.0-0.1)      0.1 10^3/ul
(0.0-0.1)


Basophils # (Manual)
           0.1 10^3/ul
(0.0-0.0)  



Nucleated Red Blood Cells           10^3/ul
(0.0-0.0)      0.0 10^3/ul
(0.0-0.0)


#



Platelet Estimate           NORMAL


Polychromasia                               1+ (0-0)


Anisocytosis                                1+ (0-0)


Microcytosis                                1+ (0-0)


Sodium Level
                    141 mmol/L
(135-144)       142 mmol/L
(135-144)


Potassium Level
                 3.9 mmol/L
(3.5-5.1)       3.6 mmol/L
(3.5-5.1)


Chloride Level
                   101 mmol/L
()        101 mmol/L
()


Carbon Dioxide Level
               29 mmol/L
(21-31)          30 mmol/L
(21-31)


Anion Gap                                  11 (5-13)                  11 (5-13)


Blood Urea Nitrogen
                  13 mg/dl
(7-20)            14 mg/dl
(7-20)


Creatinine
                    0.75 mg/dl
(0.61-1.24)     0.66 mg/dl
(0.61-1.24)


Est Glomerular Filtrat      > 60 mL/min
(>60)          > 60 mL/min
(>60)


Rate
mL/min


Glucose Level
                     100 mg/dl
()         105 mg/dl
()


Calcium Level
                   9.0 mg/dl
(8.4-10.2)       8.8 mg/dl
(8.4-10.2)














ANTON GALVAN                   Jan 29, 2019 09:27

## 2019-01-29 NOTE — HP
Date/Time of Note


Date/Time of Note


DATE: 1/29/19 


TIME: 09:20





Assessment/Plan


VTE Prophylaxis


SCD applied (from Nsg):  Yes


SCD contraindicated:  low risk/ambulating


Pharmacological prophylaxis:  NA/contraindicated


Pharm contraindication:  low risk/ambulating





Lines/Catheters


IV Catheter Type (from Nrsg):  Saline Lock


Central line still needed:  No


Urinary Cath still in place:  No





Assessment/Plan


Problems:  


(1) Right inguinal pain


Status:  Acute


Assessment/Plan


Normal white count.  CT scan shows routine postoperative changes and no evidence


of infection or fluid collection or seroma.  I explained to the patient that 


these are routine postoperative changes and there is no need for antibiotics.  


He complains of persistent pain so I will therefore start him on Percocet at a 


higher dose and also laxatives for constipation.  I have sent a prescription for


laxatives as well as Percocet 10 mg formulation to his pharmacy.  He will be 


discharged.


Cont Hosp Indication/DC Plan:  DC home today


Result Diagram:  


1/29/19 0527                                                                    


           1/29/19 0527





Results 24hrs





Laboratory Tests


       Test
                                1/28/19
21:04  1/29/19
05:27


       White Blood Count                            8.8            8.1


       Red Blood Count                            3.89  L        4.06  L


       Hemoglobin                                 11.5  L        12.1  L


       Hematocrit                                 34.8  L        36.2  L


       Mean Corpuscular Volume                     89.5           89.2


       Mean Corpuscular Hemoglobin                 29.6           29.8


       Mean Corpuscular Hemoglobin
Concent        33.0  
        33.4  



       Red Cell Distribution Width                 13.1           12.9


       Platelet Count                               183            197


       Mean Platelet Volume                         9.7            9.7


       Immature Granulocytes %                   0.900  H       0.500  H


       Neutrophils %                                              50.1


       Segmented Neutrophils %
(Manual)             53  
  



       Lymphocytes %                                              32.9


       Lymphocytes % (Manual)                        35


       Monocytes %                                               13.1  H


       Monocytes % (Manual)                           8


       Eosinophils %                                               2.8


       Eosinophils % (Manual)                         2


       Basophils %                                                 0.6


       Basophils % (Manual)                           2


       Nucleated Red Blood Cells %                  0.0            0.0


       Immature Granulocytes #                   0.080  H       0.040  H


       Neutrophils #                                               4.1


       Lymphocytes (Manual)                        3.0  H


       Lymphocytes #                                               2.7


       Monocytes #                                                1.1  H


       Monocytes # (Manual)                         0.7


       Eosinophils #                                               0.2


       Basophils #                                                 0.1


       Basophils # (Manual)                        0.1  H


       Nucleated Red Blood Cells #                                 0.0


       Platelet Estimate                    NORMAL


       Polychromasia                                 1+


       Anisocytosis                                  1+


       Microcytosis                                  1+


       Sodium Level                                 141            142


       Potassium Level                              3.9            3.6


       Chloride Level                               101            101


       Carbon Dioxide Level                          29             30


       Anion Gap                                     11             11


       Blood Urea Nitrogen                           13             14


       Creatinine                                  0.75           0.66


       Est Glomerular Filtrat Rate
mL/min   > 60  
        > 60  



       Glucose Level                                100            105


       Calcium Level                                9.0            8.8








HPI/ROS


Admit Date/Time


Admit Date/Time


Jan 28, 2019 at 21:06





Hx of Present Illness


This gentleman is postop day 3 from right open inguinal hernia repair.  He was 


concerned about swelling and discoloration of the wound and therefore was 


referred to the emergency department.  Workup reveals no abscess may be possible


cellulitis.  The emergency room called me and I requested the patient be 


admitted for observation.





ROS


Constitutional:  No chills, No diaphoresis, No febrile, No nausea, No poor po


Cardiovascular:  no complaints


Gastrointestinal:  constipation


Genitourinary:  no complaints


Skin:  bruising





PMH/Family/Social


Past Medical History


Medical History:  no pertinent history


Medications





Current Medications


Sodium Chloride 1,000 ml @  70 mls/hr [id number] IV  Last administered on 1/29/19at


01:23; Admin Dose 70 MLS/HR;  Start 1/29/19 at 01:00


Acetaminophen (Tylenol Tab) 650 mg Q6H  PRN PO MILD PAIN(1-3)OR ELEVATED TEMP;  


Start 1/29/19 at 01:00


Ondansetron HCl (Zofran Inj) 4 mg Q6H  PRN IV NAUSEA AND/OR VOMITING;  Start 


1/29/19 at 01:00


Pantoprazole (Protonix Iv) 40 mg DAILY@06 IV  Last administered on 1/29/19at 


06:07; Admin Dose 40 MG;  Start 1/29/19 at 06:00


Vancomycin HCl (Vanco Iv Per Pharmacy) VANCOMYCIN PER PHARMACY PER PROTOCOL XX ;


 Start 1/29/19 at 01:00


Levetiracetam (Keppra) 750 mg BID PO  Last administered on 1/29/19at 09:02; 


Admin Dose 750 MG;  Start 1/29/19 at 09:00


Trazodone HCl (Desyrel) 50 mg HS PO  Last administered on 1/29/19at 01:23; Admin


Dose 50 MG;  Start 1/29/19 at 01:06


Vancomycin HCl 250 ml @  125 mls/hr Q12H IVPB  Last administered on 1/29/19at 


06:06; Admin Dose 125 MLS/HR;  Start 1/29/19 at 06:00


Coded Allergies:  


     Sulfa (Sulfonamide Antibiotics) (Verified  Allergy, Unknown, swelling, 


1/25/19)


     piperacillin (Verified  Allergy, Unknown, lip swelling & redness, facial 


itching & flushing, 1/28/19)


     tazobactam (Verified  Allergy, Unknown, lip swelling & redness, facial 


itching & flushing, 1/28/19)





Past Surgical History


Postop day 3 right inguinal hernia repair





Family History


Significant Family History:  no pertinent family hx





Social History


Alcohol Use:  none


Smoking Status:  Never smoker


Drug Use:  none





Exam/Review of Systems


Vital Signs


Vitals





Vital Signs


  Date      Temp  Pulse  Resp  B/P (MAP)   Pulse Ox  O2          O2 Flow    FiO2


Time                                                 Delivery    Rate


   1/29/19  98.2     71    20      156/96        98


     07:40                          (116)


   1/28/19                                           Room Air


     23:18








Intake and Output





1/28/19 1/28/19 1/29/19





1515:00


23:00


07:00





IntakeIntake Total


20 ml


740 ml





BalanceBalance


20 ml


740 ml














Exam


Constitutional:  alert, oriented


Gastrointestinal:  other (Right inguinal hernia site clean dry and intact.  Very


mild bruising along the skin that seems to be improving.  No evidence of 


cellulitis no erythema.)


Genitourinary - Male:  nl penis











TRISTIANNORMA CARRERAZA                   Jan 29, 2019 09:24

## 2019-04-16 ENCOUNTER — HOSPITAL ENCOUNTER (EMERGENCY)
Dept: HOSPITAL 10 - E/R | Age: 64
Discharge: HOME | End: 2019-04-16
Payer: COMMERCIAL

## 2019-04-16 ENCOUNTER — HOSPITAL ENCOUNTER (EMERGENCY)
Dept: HOSPITAL 91 - E/R | Age: 64
Discharge: HOME | End: 2019-04-16
Payer: COMMERCIAL

## 2019-04-16 VITALS — DIASTOLIC BLOOD PRESSURE: 78 MMHG | SYSTOLIC BLOOD PRESSURE: 123 MMHG | HEART RATE: 71 BPM | RESPIRATION RATE: 14 BRPM

## 2019-04-16 VITALS
WEIGHT: 169.54 LBS | HEIGHT: 65 IN | BODY MASS INDEX: 28.25 KG/M2 | BODY MASS INDEX: 28.25 KG/M2 | HEIGHT: 65 IN | WEIGHT: 169.54 LBS

## 2019-04-16 DIAGNOSIS — J18.1: Primary | ICD-10-CM

## 2019-04-16 LAB
ADD MAN DIFF?: NO
ALANINE AMINOTRANSFERASE: 27 IU/L (ref 13–69)
ALBUMIN/GLOBULIN RATIO: 1.33
ALBUMIN: 4 G/DL (ref 3.3–4.9)
ALKALINE PHOSPHATASE: 73 IU/L (ref 42–121)
ANION GAP: 7 (ref 5–13)
ASPARTATE AMINO TRANSFERASE: 27 IU/L (ref 15–46)
BASOPHIL #: 0.1 10^3/UL (ref 0–0.1)
BASOPHILS %: 1 % (ref 0–2)
BILIRUBIN,DIRECT: 0 MG/DL (ref 0–0.2)
BILIRUBIN,TOTAL: 0.4 MG/DL (ref 0.2–1.3)
BLOOD UREA NITROGEN: 16 MG/DL (ref 7–20)
CALCIUM: 9.2 MG/DL (ref 8.4–10.2)
CARBON DIOXIDE: 25 MMOL/L (ref 21–31)
CHLORIDE: 107 MMOL/L (ref 97–110)
CREATININE: 0.78 MG/DL (ref 0.61–1.24)
EOSINOPHILS #: 0.3 10^3/UL (ref 0–0.5)
EOSINOPHILS %: 3.5 % (ref 0–7)
GLOBULIN: 3 G/DL (ref 1.3–3.2)
GLUCOSE: 84 MG/DL (ref 70–220)
HEMATOCRIT: 41.7 % (ref 42–52)
HEMOGLOBIN: 13.9 G/DL (ref 14–18)
IMMATURE GRANS #M: 0.02 10^3/UL (ref 0–0.03)
IMMATURE GRANS % (M): 0.3 % (ref 0–0.43)
LYMPHOCYTES #: 3 10^3/UL (ref 0.8–2.9)
LYMPHOCYTES %: 42.9 % (ref 15–51)
MEAN CORPUSCULAR HEMOGLOBIN: 29.2 PG (ref 29–33)
MEAN CORPUSCULAR HGB CONC: 33.3 G/DL (ref 32–37)
MEAN CORPUSCULAR VOLUME: 87.6 FL (ref 82–101)
MEAN PLATELET VOLUME: 9.4 FL (ref 7.4–10.4)
MONOCYTE #: 0.9 10^3/UL (ref 0.3–0.9)
MONOCYTES %: 12.1 % (ref 0–11)
NEUTROPHIL #: 2.9 10^3/UL (ref 1.6–7.5)
NEUTROPHILS %: 40.2 % (ref 39–77)
NUCLEATED RED BLOOD CELLS #: 0 10^3/UL (ref 0–0)
NUCLEATED RED BLOOD CELLS%: 0 /100WBC (ref 0–0)
PLATELET COUNT: 275 10^3/UL (ref 140–415)
POTASSIUM: 4 MMOL/L (ref 3.5–5.1)
RED BLOOD COUNT: 4.76 10^6/UL (ref 4.7–6.1)
RED CELL DISTRIBUTION WIDTH: 12.4 % (ref 11.5–14.5)
SODIUM: 139 MMOL/L (ref 135–144)
TOTAL PROTEIN: 7 G/DL (ref 6.1–8.1)
TROPONIN-I: < 0.012 NG/ML (ref 0–0.12)
WHITE BLOOD COUNT: 7.1 10^3/UL (ref 4.8–10.8)

## 2019-04-16 PROCEDURE — 84484 ASSAY OF TROPONIN QUANT: CPT

## 2019-04-16 PROCEDURE — 99285 EMERGENCY DEPT VISIT HI MDM: CPT

## 2019-04-16 PROCEDURE — 94664 DEMO&/EVAL PT USE INHALER: CPT

## 2019-04-16 PROCEDURE — 93005 ELECTROCARDIOGRAM TRACING: CPT

## 2019-04-16 PROCEDURE — 94640 AIRWAY INHALATION TREATMENT: CPT

## 2019-04-16 PROCEDURE — 85025 COMPLETE CBC W/AUTO DIFF WBC: CPT

## 2019-04-16 PROCEDURE — 96375 TX/PRO/DX INJ NEW DRUG ADDON: CPT

## 2019-04-16 PROCEDURE — 80053 COMPREHEN METABOLIC PANEL: CPT

## 2019-04-16 PROCEDURE — 71045 X-RAY EXAM CHEST 1 VIEW: CPT

## 2019-04-16 PROCEDURE — 87040 BLOOD CULTURE FOR BACTERIA: CPT

## 2019-04-16 PROCEDURE — 36415 COLL VENOUS BLD VENIPUNCTURE: CPT

## 2019-04-16 PROCEDURE — 96374 THER/PROPH/DIAG INJ IV PUSH: CPT

## 2019-04-16 RX ADMIN — ALBUTEROL SULFATE 1 MG: 2.5 SOLUTION RESPIRATORY (INHALATION) at 19:51

## 2019-04-16 RX ADMIN — METHYLPREDNISOLONE SODIUM SUCCINATE 1 MG: 125 INJECTION, POWDER, FOR SOLUTION INTRAMUSCULAR; INTRAVENOUS at 14:59

## 2019-04-16 RX ADMIN — ALBUTEROL SULFATE 1 MG: 2.5 SOLUTION RESPIRATORY (INHALATION) at 15:28

## 2019-04-16 RX ADMIN — VANCOMYCIN HYDROCHLORIDE 1 MLS/HR: 1 INJECTION, POWDER, LYOPHILIZED, FOR SOLUTION INTRAVENOUS at 17:04

## 2019-04-16 NOTE — ERD
ER Documentation


Chief Complaint


Chief Complaint





CP, sob cough x 1 week





HPI


This is 63-year-old male who arrived here from [place] 3 days ago.  While in [place]


he started having a cough fever and green sputum production and saw his doctor 


there and had a chest x-ray which showed bilateral lower lobe atelectasis.  He 


said he was diagnosed with bronchitis and was given Levaquin, but then had an 


allergic reaction to it and was switched to Keflex.  He says is not helping 


much.  Today he is having cough wheezing chest pain with cough only and continue


low-grade fevers.





ROS


All systems reviewed and are negative except as per history of present illness.





Medications


Home Meds


Reported Medications


Gabapentin* (Gabapentin*) 300 Mg Capsule, 300 MG PO TID, #90 CAP


   4/16/19


Trazodone Hcl* (Desyrel*) 100 Mg Tab, 150 MG PO QHS, #30 TAB


   4/16/19


Levetiracetam* (Levetiracetam*) 750 Mg Tablet, 750 MG PO BID, TAB


   4/16/19


Montelukast Sodium* (Montelukast Sodium*) 10 Mg Tablet, 10 MG PO QHS, #30 TAB


   4/16/19


Loratadine* (Loratadine*) 10 Mg Tablet, 10 MG PO DAILY, #30 TAB


   4/16/19


Diclofenac Sodium* (Diclofenac Sodium*) 50 Mg Tablet.dr, 50 MG PO BID, #60 TAB


   4/16/19


Discontinued Reported Medications


Trazodone Hcl* (Trazodone Hcl*) 50 Mg Tablet, 50 MG PO DAILY, #30 TAB


   1/28/19


Diclofenac Sodium* (Diclofenac Sodium*) 50 Mg Tablet.dr, 50 MG PO TID, #90 TAB


   1/28/19


Diclofenac Sodium* (Diclofenac Sodium*) 75 Mg Tablet.dr, 75 MG PO DAILY PRN for 


PAIN AND/OR INFLAMMATION, #60 TAB


   1/25/19





Allergies


Allergies:  


Coded Allergies:  


     Sulfa (Sulfonamide Antibiotics) (Verified  Allergy, Unknown, swelling, 


4/16/19)


     levofloxacin (Verified  Allergy, Unknown, 4/16/19)


     piperacillin (Verified  Allergy, Unknown, lip swelling & redness, facial 


itching & flushing, 4/16/19)


     tazobactam (Verified  Allergy, Unknown, lip swelling & redness, facial 


itching & flushing, 4/16/19)





PMhx/Soc


History of Surgery:  Yes (right hip ORIF,  1/25 hernia repair)


Anesthesia Reaction:  No


Hx Neurological Disorder:  Yes (seizure)


Hx Respiratory Disorders:  No


Hx Cardiac Disorders:  No


Hx Psychiatric Problems:  Yes (depression)


Hx Miscellaneous Medical Probl:  No


Hx Alcohol Use:  No


Hx Substance Use:  No


Hx Tobacco Use:  No


Smoking Status:  Never smoker





FmHx


Family History:  No coronary disease





Physical Exam


Vitals





Vital Signs


  Date      Temp  Pulse  Resp  B/P (MAP)   Pulse Ox  O2          O2 Flow    FiO2


Time                                                 Delivery    Rate


   4/16/19           78    19      135/78        98  Nasal


     16:11                           (97)            Cannula


   4/16/19           57    20                    99


     15:29


   4/16/19  98.3     67    21      152/73        99


     14:23                           (99)





Physical Exam


 Const:      Well-developed, well-nourished


 Head:        Atraumatic, normocephalic


 Eyes:       Normal Conjunctiva, PERRLA, EOMI, normal sclera, no nystagmus


 ENT:         Normal External Ears, Nose and Mouth, moist mucus membranes.


 Neck:        Full range of motion.  No meningismus, no lymphadenopathy.


 Resp:         No increased work of breathing there is some scattered rhonchi 


with good air movement


 Cardio:       Regular rate and rhythm, no murmurs, S1 S2 present


 Abd:         Soft, non tender x 4, non distended. Normal bowel sounds, no 


guarding or rebound, no pulsitile abdominal masses or bruits


 Skin:         No petechiae or rashes, no ecchymosis , no maculopapular rash


 Back:        No midline or flank tenderness


 Ext:          No cyanosis, or edema, FROM x 4, normal inspection, 


neurovascularly intact x 4


 Neur:        Awake and alert, STR 5/5 x 4, sensation intact x 4, no focal 


findings, cerebellum intact


 Psych:        Normal Mood and Affect


Result Diagram:  


4/16/19 1457                                                                    


           4/16/19 1457





Results 24 hrs





Laboratory Tests


              Test
                                 4/16/19
14:57


              White Blood Count                      7.1 10^3/ul


              Red Blood Count                       4.76 10^6/ul


              Hemoglobin                               13.9 g/dl


              Hematocrit                                  41.7 %


              Mean Corpuscular Volume                    87.6 fl


              Mean Corpuscular Hemoglobin                29.2 pg


              Mean Corpuscular Hemoglobin
Concent     33.3 g/dl 



              Red Cell Distribution Width                 12.4 %


              Platelet Count                         275 10^3/UL


              Mean Platelet Volume                        9.4 fl


              Immature Granulocytes %                    0.300 %


              Neutrophils %                               40.2 %


              Lymphocytes %                               42.9 %


              Monocytes %                                 12.1 %


              Eosinophils %                                3.5 %


              Basophils %                                  1.0 %


              Nucleated Red Blood Cells %            0.0 /100WBC


              Immature Granulocytes #              0.020 10^3/ul


              Neutrophils #                          2.9 10^3/ul


              Lymphocytes #                          3.0 10^3/ul


              Monocytes #                            0.9 10^3/ul


              Eosinophils #                          0.3 10^3/ul


              Basophils #                            0.1 10^3/ul


              Nucleated Red Blood Cells #            0.0 10^3/ul


              Sodium Level                            139 mmol/L


              Potassium Level                         4.0 mmol/L


              Chloride Level                          107 mmol/L


              Carbon Dioxide Level                     25 mmol/L


              Anion Gap                                        7


              Blood Urea Nitrogen                       16 mg/dl


              Creatinine                              0.78 mg/dl


              Est Glomerular Filtrat Rate
mL/min   > 60 mL/min 



              Glucose Level                             84 mg/dl


              Calcium Level                            9.2 mg/dl


              Total Bilirubin                          0.4 mg/dl


              Direct Bilirubin                        0.00 mg/dl


              Indirect Bilirubin                       0.4 mg/dl


              Aspartate Amino Transf
(AST/SGOT)         27 IU/L 



              Alanine Aminotransferase
(ALT/SGPT)       27 IU/L 



              Alkaline Phosphatase                       73 IU/L


              Troponin I                           < 0.012 ng/ml


              Total Protein                             7.0 g/dl


              Albumin                                   4.0 g/dl


              Globulin                                 3.00 g/dl


              Albumin/Globulin Ratio                        1.33





Current Medications


 Medications
   Dose
          Sig/Homero
       Start Time
   Status  Last


 (Trade)       Ordered        Route
 PRN     Stop Time              Admin
Dose


                              Reason                                Admin


                125 mg         ONCE  ONCE
    4/16/19       DC           4/16/19


Methylprednis                 IV
            15:00
                       14:59



olone
 Sodium                                4/16/19 15:01


Succinate



(Solu-Medrol)


 Albuterol
     7.5 mg         ONCE  ONCE
    4/16/19       DC           4/16/19


(Proventil
                   INH
           15:00
                       15:28



0.083% (Neb))                                4/16/19 15:01


 Vancomycin     250 ml @ 
     ONCE  STAT
    4/16/19                



HCl            125 mls/hr     IVPB
          16:25



                                             4/16/19 18:24








Procedures/MDM


EKG: 


Rate/Rhythm:             Normal Sinus Rhythm,NL intervals


QRS, ST, QT:    NORMAL KS, QRS, QT]


Impression:      NORMAL EKG














       MR #:    [id number]   Acct #:   [id number]    DOS: 04/16/19 1450


Ordering MD: VANE SANABRIA DO   Location:  E/R   Room/Bed:                


                           


                                        


PROCEDURE:   XR Chest AP portable 


 


CLINICAL INDICATION:   Short of breath 


 


TECHNIQUE:   An AP portable radiograph of the chest was submitted. 


 


COMPARISON:   None. 


 


FINDINGS:


 


Support Hardware: None


 


Cardiovascular: The cardiovascular silhouette appears unremarkable.


 


Lung Fields: There is a small focus of nodular infiltrate seen at the left lung 


base lateral to the cardiac apex. Fields are otherwise clear.


 


Pleural Spaces: No pneumothorax or pleural effusion is identified.


 


Osseous Structures: The osseous structures appear intact.


 


Soft Tissues: The soft tissues appear unremarkable.


 


IMPRESSION: 


1.  There is a focal nodular infiltrate seen at the left lung base lateral to 


the cardiac apex. In the right clinical setting, this could represent a focal 


pneumonia. This should be followed with a repeat chest radiograph after 


treatment to ensure resolution and exclude other etiologies.


2.  Otherwise, unremarkable portable chest.


_____________________________________________ 


Physician Jacinda           Date    Time 


Electronically viewed and signed by Physician Jacinda on 04/16/2019 16:01 


 


D:  04/16/2019 16:01  T:  04/16/2019 16:01


RH/





CC: VANE SANABRIA DO





[phone]














Will give IV Vanc.  and DC home on inhaler and zithromax





sats are 98% on RA





Gave copy of CXR print out for folw up CXR





Departure


Diagnosis:  


   Primary Impression:  


   Pneumonia


   Pneumonia type:  due to unspecified organism  Laterality:  left  Lung 


   location:  lower lobe of lung  Qualified Codes:  J18.1 - Lobar pneumonia, 


   unspecified organism


Condition:  Stable











VANE SANABRIA DO         Apr 16, 2019 15:31

## 2019-11-01 ENCOUNTER — HOSPITAL ENCOUNTER (EMERGENCY)
Dept: HOSPITAL 10 - E/R | Age: 64
Discharge: HOME | End: 2019-11-01
Payer: COMMERCIAL

## 2019-11-01 VITALS
HEIGHT: 66 IN | BODY MASS INDEX: 27.6 KG/M2 | HEIGHT: 66 IN | BODY MASS INDEX: 27.6 KG/M2 | WEIGHT: 171.74 LBS | WEIGHT: 171.74 LBS

## 2019-11-01 VITALS — HEART RATE: 79 BPM | DIASTOLIC BLOOD PRESSURE: 81 MMHG | SYSTOLIC BLOOD PRESSURE: 130 MMHG | RESPIRATION RATE: 19 BRPM

## 2019-11-01 DIAGNOSIS — R07.89: ICD-10-CM

## 2019-11-01 DIAGNOSIS — J22: Primary | ICD-10-CM

## 2019-11-01 PROCEDURE — 93005 ELECTROCARDIOGRAM TRACING: CPT

## 2019-11-01 PROCEDURE — 71045 X-RAY EXAM CHEST 1 VIEW: CPT
